# Patient Record
Sex: MALE | Race: NATIVE HAWAIIAN OR OTHER PACIFIC ISLANDER | Employment: OTHER | URBAN - METROPOLITAN AREA
[De-identification: names, ages, dates, MRNs, and addresses within clinical notes are randomized per-mention and may not be internally consistent; named-entity substitution may affect disease eponyms.]

---

## 2023-08-04 ENCOUNTER — APPOINTMENT (OUTPATIENT)
Dept: PRIMARY CARE | Facility: CLINIC | Age: 33
End: 2023-08-04
Payer: MEDICAID

## 2023-09-01 ENCOUNTER — APPOINTMENT (OUTPATIENT)
Dept: PRIMARY CARE | Facility: CLINIC | Age: 33
End: 2023-09-01
Payer: MEDICAID

## 2023-09-15 ENCOUNTER — APPOINTMENT (OUTPATIENT)
Dept: PRIMARY CARE | Facility: CLINIC | Age: 33
End: 2023-09-15
Payer: MEDICAID

## 2023-09-22 ENCOUNTER — OFFICE VISIT (OUTPATIENT)
Dept: PRIMARY CARE | Facility: CLINIC | Age: 33
End: 2023-09-22
Payer: MEDICAID

## 2023-09-22 VITALS
TEMPERATURE: 97.2 F | HEART RATE: 101 BPM | WEIGHT: 245 LBS | SYSTOLIC BLOOD PRESSURE: 122 MMHG | HEIGHT: 70 IN | DIASTOLIC BLOOD PRESSURE: 78 MMHG | BODY MASS INDEX: 35.07 KG/M2 | OXYGEN SATURATION: 96 %

## 2023-09-22 DIAGNOSIS — E66.9 CLASS 2 OBESITY: ICD-10-CM

## 2023-09-22 DIAGNOSIS — Z00.00 ROUTINE GENERAL MEDICAL EXAMINATION AT A HEALTH CARE FACILITY: Primary | ICD-10-CM

## 2023-09-22 DIAGNOSIS — Z13.1 SCREENING FOR DIABETES MELLITUS: ICD-10-CM

## 2023-09-22 DIAGNOSIS — Z13.29 SCREENING FOR THYROID DISORDER: ICD-10-CM

## 2023-09-22 DIAGNOSIS — Z13.220 SCREENING, LIPID: ICD-10-CM

## 2023-09-22 PROCEDURE — 1036F TOBACCO NON-USER: CPT | Performed by: FAMILY MEDICINE

## 2023-09-22 PROCEDURE — 99385 PREV VISIT NEW AGE 18-39: CPT | Performed by: FAMILY MEDICINE

## 2023-09-22 ASSESSMENT — ENCOUNTER SYMPTOMS: SHORTNESS OF BREATH: 0

## 2023-09-22 NOTE — PROGRESS NOTES
Assessment     ASSESSMENT/PLAN:      Problem List Items Addressed This Visit    None  Visit Diagnoses       Routine general medical examination at a health care facility    -  Primary    Screening for diabetes mellitus        Relevant Orders    Hemoglobin A1c    Comprehensive Metabolic Panel    Screening, lipid        Relevant Orders    Lipid panel    Class 2 obesity        Screening for thyroid disorder        Relevant Orders    Tsh With Reflex To Free T4 If Abnormal            Patient Instructions:  Patient Instructions   Get screening labs       Signed by: Sharif Flores DO       FUTURE DIRECTION:   Review labs, could be associated with elevated A1c    Subjective   SUBJECTIVE:     HPI : Patient is a 33 y.o. male who presents today for the following:     Pt states that he is , and had a slightly elevated blood and urine glucose. He was seeing endoocrinologist at one point but has never needed medication. He denies frequent urination.       Review of Systems   Respiratory:  Negative for shortness of breath.    Cardiovascular:  Negative for chest pain.       History reviewed. No pertinent past medical history.     History reviewed. No pertinent surgical history.     No current outpatient medications     No Known Allergies     Social History     Socioeconomic History    Marital status:      Spouse name: Not on file    Number of children: Not on file    Years of education: Not on file    Highest education level: Not on file   Occupational History    Not on file   Tobacco Use    Smoking status: Never    Smokeless tobacco: Never   Vaping Use    Vaping Use: Never used   Substance and Sexual Activity    Alcohol use: Never    Drug use: Never    Sexual activity: Not on file   Other Topics Concern    Not on file   Social History Narrative    Not on file     Social Determinants of Health     Financial Resource Strain: Not on file   Food Insecurity: Not on file   Transportation Needs: Not on file  "  Physical Activity: Not on file   Stress: Not on file   Social Connections: Not on file   Intimate Partner Violence: Not on file   Housing Stability: Not on file        Family History   Problem Relation Name Age of Onset    Diabetes type I Mother      Arthritis Mother      Pancreatic cancer Father          Objective     OBJECTIVE:     Vitals:    09/22/23 1637   BP: 122/78   Pulse: 101   Temp: 36.2 °C (97.2 °F)   SpO2: 96%   Weight: 111 kg (245 lb)   Height: 1.778 m (5' 10\")        Physical Exam  HENT:      Head: Normocephalic and atraumatic.      Nose: Nose normal.      Mouth/Throat:      Mouth: Mucous membranes are moist.   Eyes:      Pupils: Pupils are equal, round, and reactive to light.   Cardiovascular:      Rate and Rhythm: Normal rate and regular rhythm.      Pulses: Normal pulses.      Heart sounds: No murmur heard.  Pulmonary:      Effort: Pulmonary effort is normal.      Breath sounds: Normal breath sounds.   Abdominal:      Tenderness: There is no abdominal tenderness.   Musculoskeletal:         General: Normal range of motion.      Cervical back: Normal range of motion.   Skin:     General: Skin is warm and dry.   Neurological:      Mental Status: He is alert.   Psychiatric:         Mood and Affect: Mood normal.           "

## 2023-09-23 ENCOUNTER — LAB (OUTPATIENT)
Dept: LAB | Facility: LAB | Age: 33
End: 2023-09-23
Payer: MEDICAID

## 2023-09-23 DIAGNOSIS — Z13.220 SCREENING, LIPID: ICD-10-CM

## 2023-09-23 DIAGNOSIS — Z13.1 SCREENING FOR DIABETES MELLITUS: ICD-10-CM

## 2023-09-23 DIAGNOSIS — Z13.29 SCREENING FOR THYROID DISORDER: ICD-10-CM

## 2023-09-23 LAB
ALANINE AMINOTRANSFERASE (SGPT) (U/L) IN SER/PLAS: 31 U/L (ref 10–52)
ALBUMIN (G/DL) IN SER/PLAS: 4.3 G/DL (ref 3.4–5)
ALKALINE PHOSPHATASE (U/L) IN SER/PLAS: 87 U/L (ref 33–120)
ANION GAP IN SER/PLAS: 12 MMOL/L (ref 10–20)
ASPARTATE AMINOTRANSFERASE (SGOT) (U/L) IN SER/PLAS: 25 U/L (ref 9–39)
BILIRUBIN TOTAL (MG/DL) IN SER/PLAS: 1.1 MG/DL (ref 0–1.2)
CALCIUM (MG/DL) IN SER/PLAS: 9.1 MG/DL (ref 8.6–10.3)
CARBON DIOXIDE, TOTAL (MMOL/L) IN SER/PLAS: 24 MMOL/L (ref 21–32)
CHLORIDE (MMOL/L) IN SER/PLAS: 106 MMOL/L (ref 98–107)
CHOLESTEROL (MG/DL) IN SER/PLAS: 127 MG/DL (ref 0–199)
CHOLESTEROL IN HDL (MG/DL) IN SER/PLAS: 35.6 MG/DL
CHOLESTEROL/HDL RATIO: 3.6
CREATININE (MG/DL) IN SER/PLAS: 0.87 MG/DL (ref 0.5–1.3)
ESTIMATED AVERAGE GLUCOSE FOR HBA1C: 137 MG/DL
GFR MALE: >90 ML/MIN/1.73M2
GLUCOSE (MG/DL) IN SER/PLAS: 112 MG/DL (ref 74–99)
HEMOGLOBIN A1C/HEMOGLOBIN TOTAL IN BLOOD: 6.4 %
LDL: 73 MG/DL (ref 0–99)
POTASSIUM (MMOL/L) IN SER/PLAS: 4.1 MMOL/L (ref 3.5–5.3)
PROTEIN TOTAL: 7 G/DL (ref 6.4–8.2)
SODIUM (MMOL/L) IN SER/PLAS: 138 MMOL/L (ref 136–145)
THYROTROPIN (MIU/L) IN SER/PLAS BY DETECTION LIMIT <= 0.05 MIU/L: 1.67 MIU/L (ref 0.44–3.98)
TRIGLYCERIDE (MG/DL) IN SER/PLAS: 91 MG/DL (ref 0–149)
UREA NITROGEN (MG/DL) IN SER/PLAS: 13 MG/DL (ref 6–23)
VLDL: 18 MG/DL (ref 0–40)

## 2023-09-23 PROCEDURE — 80053 COMPREHEN METABOLIC PANEL: CPT

## 2023-09-23 PROCEDURE — 80061 LIPID PANEL: CPT

## 2023-09-23 PROCEDURE — 83036 HEMOGLOBIN GLYCOSYLATED A1C: CPT

## 2023-09-23 PROCEDURE — 36415 COLL VENOUS BLD VENIPUNCTURE: CPT

## 2023-09-23 PROCEDURE — 84443 ASSAY THYROID STIM HORMONE: CPT

## 2023-09-26 ENCOUNTER — TELEPHONE (OUTPATIENT)
Dept: PRIMARY CARE | Facility: CLINIC | Age: 33
End: 2023-09-26
Payer: MEDICAID

## 2023-10-20 ENCOUNTER — APPOINTMENT (OUTPATIENT)
Dept: UROLOGY | Facility: CLINIC | Age: 33
End: 2023-10-20
Payer: MEDICAID

## 2024-04-02 ENCOUNTER — TELEPHONE (OUTPATIENT)
Dept: UROLOGY | Facility: CLINIC | Age: 34
End: 2024-04-02
Payer: MEDICAID

## 2024-04-02 NOTE — TELEPHONE ENCOUNTER
Left message for patient to return our call, had to move his appointment from 4/5 to 4/12 @ 10:30 due to Dr. Hammer being out of the office

## 2024-04-05 ENCOUNTER — APPOINTMENT (OUTPATIENT)
Dept: UROLOGY | Facility: CLINIC | Age: 34
End: 2024-04-05
Payer: MEDICAID

## 2024-04-08 ENCOUNTER — OFFICE VISIT (OUTPATIENT)
Dept: UROLOGY | Facility: CLINIC | Age: 34
End: 2024-04-08
Payer: MEDICAID

## 2024-04-08 DIAGNOSIS — Z30.09 FAMILY PLANNING: Primary | ICD-10-CM

## 2024-04-08 PROCEDURE — 99202 OFFICE O/P NEW SF 15 MIN: CPT | Performed by: UROLOGY

## 2024-04-08 RX ORDER — DIAZEPAM 5 MG/1
5 TABLET ORAL AS NEEDED
Qty: 1 TABLET | Refills: 0 | Status: SHIPPED | OUTPATIENT
Start: 2024-04-08 | End: 2024-04-15

## 2024-04-08 NOTE — PROGRESS NOTES
04/08/2024  We discussed scalpeless vasectomy procedure, the indications and potential side effects and complications. Patient expressed understanding and agreed to proceed the procedure.    Plan  Valium 5 mg 1 hour before procedure  Vasectomy    I have personally reviewed the OARRS report for this patient. This report is scanned into the electronic medical record. I have considered the risk of abuse, dependence, addictions and diversion. I believe that it is clinically appropriate for this patient to be prescribed this medication    Chief Complaint   Patient presents with    Sterilization     Patient here today for a vasectomy consult.  Patient  with 2 biological children, no pain or trauma to the testicles.         Physical Exam     TODAYS LAB RESULTS:    Patient unable to leave urine sample.     ASSESSMENT&PLAN:      IMPRESSIONS:

## 2024-04-12 ENCOUNTER — APPOINTMENT (OUTPATIENT)
Dept: UROLOGY | Facility: CLINIC | Age: 34
End: 2024-04-12
Payer: MEDICAID

## 2024-06-21 ENCOUNTER — APPOINTMENT (OUTPATIENT)
Dept: UROLOGY | Facility: CLINIC | Age: 34
End: 2024-06-21
Payer: MEDICAID

## 2024-06-21 DIAGNOSIS — Z98.52 STATUS POST VASECTOMY: Primary | ICD-10-CM

## 2024-06-21 DIAGNOSIS — Z30.09 FAMILY PLANNING: ICD-10-CM

## 2024-06-21 PROCEDURE — 55250 REMOVAL OF SPERM DUCT(S): CPT | Performed by: UROLOGY

## 2024-06-21 RX ORDER — HYDROCODONE BITARTRATE AND ACETAMINOPHEN 5; 325 MG/1; MG/1
1 TABLET ORAL EVERY 6 HOURS PRN
Qty: 20 TABLET | Refills: 0 | Status: SHIPPED | OUTPATIENT
Start: 2024-06-21 | End: 2024-06-28

## 2024-06-21 RX ORDER — LIDOCAINE HYDROCHLORIDE 10 MG/ML
10 INJECTION INFILTRATION; PERINEURAL ONCE
Status: COMPLETED | OUTPATIENT
Start: 2024-06-21 | End: 2024-06-21

## 2024-06-21 RX ORDER — CEPHALEXIN 500 MG/1
500 CAPSULE ORAL 2 TIMES DAILY
Qty: 14 CAPSULE | Refills: 0 | Status: SHIPPED | OUTPATIENT
Start: 2024-06-21 | End: 2024-06-28

## 2024-06-21 NOTE — PROGRESS NOTES
06/21/2024  A scalpeless vasectomy was performed under local and patient tolerated it well.    the patient was prepped and draped in the usual sterile fashion.  While in the supine position, the left vas deferens was isolated and the skin and vas were anesthetized using 1% lidocaine.  A small incision was made in the skin using a scalpeless clamp.  The vas was delivered up into the wound and a segment was freed up and divided.  The proximal and distal ends were fulgurated. Fascial interposition stitch placed using 3-0 chromic   The ends of the vas were placed back into the scrotum and the skin was closed with all suture.  The right vas deferens was isolated and the skin and vas were anesthetized using 1% lidocaine.  A small incision was made in the skin using a scalpeless clamp.   the vas was delivered up into the wound and a segment was freed up and divided.  The proximal and distal ends were fulgurated and tied with 3-0 Chromic suture.  The ends of the vas were placed back into the scrotum and the skin was closed with suture.  The patient tolerated the procedure well and was discharged to home. He was provided with post-vasectomy instructions. He was advised to rest for 24 hours and abstain from sexual intercourse for 1 week. He was advised for the continued need for contraception until he is documented as sterile with semen analysis.    Impression  Family planning  Status post vasectomy    Plan  Norco ×20  Keflex 500 mg twice daily ×7 days  Appointment with specimen in 10 weeks        Chief Complaint   Patient presents with    Sterilization     Patient here today for a vasectomy. Patient is very nervous for procedure, let Dr. Hammer know prior to us doing procedure.        Physical Exam     TODAYS LAB RESULTS:    No Urine sample given    ASSESSMENT&PLAN:      IMPRESSIONS:         04/08/2024  We discussed scalpeless vasectomy procedure, the indications and potential side effects and complications. Patient expressed  understanding and agreed to proceed the procedure.     Plan  Valium 5 mg 1 hour before procedure  Vasectomy

## 2024-08-30 ENCOUNTER — APPOINTMENT (OUTPATIENT)
Dept: UROLOGY | Facility: CLINIC | Age: 34
End: 2024-08-30
Payer: MEDICAID

## 2024-08-30 DIAGNOSIS — Z98.52 STATUS POST VASECTOMY: Primary | ICD-10-CM

## 2024-08-30 DIAGNOSIS — N48.1 BALANITIS: ICD-10-CM

## 2024-08-30 PROCEDURE — 99024 POSTOP FOLLOW-UP VISIT: CPT | Performed by: UROLOGY

## 2024-08-30 RX ORDER — CLOTRIMAZOLE AND BETAMETHASONE DIPROPIONATE 10; .64 MG/G; MG/G
1 CREAM TOPICAL 2 TIMES DAILY
Qty: 30 G | Refills: 1 | Status: SHIPPED | OUTPATIENT
Start: 2024-08-30 | End: 2024-09-27

## 2024-08-30 NOTE — PROGRESS NOTES
08/30/2024  Patient did fine, except some burning at tip of penis    Exam: Scrotal incision healed, uncircumcised some erythematous glans penis    First Semen specimen: no sperm    Patient  10-week status post vasectomy  Balanitis    Plan:  Lotrisone cream  Drop off a second semen specimen in a week    Chief Complaint   Patient presents with    Sterilization     Patient is here today 10 week post op vasectomy         Physical Exam     TODAYS LAB RESULTS:      ASSESSMENT&PLAN:      IMPRESSIONS:      06/21/2024  A scalpeless vasectomy was performed under local and patient tolerated it well.     the patient was prepped and draped in the usual sterile fashion.  While in the supine position, the left vas deferens was isolated and the skin and vas were anesthetized using 1% lidocaine.  A small incision was made in the skin using a scalpeless clamp.  The vas was delivered up into the wound and a segment was freed up and divided.  The proximal and distal ends were fulgurated. Fascial interposition stitch placed using 3-0 chromic   The ends of the vas were placed back into the scrotum and the skin was closed with all suture.  The right vas deferens was isolated and the skin and vas were anesthetized using 1% lidocaine.  A small incision was made in the skin using a scalpeless clamp.   the vas was delivered up into the wound and a segment was freed up and divided.  The proximal and distal ends were fulgurated and tied with 3-0 Chromic suture.  The ends of the vas were placed back into the scrotum and the skin was closed with suture.  The patient tolerated the procedure well and was discharged to home. He was provided with post-vasectomy instructions. He was advised to rest for 24 hours and abstain from sexual intercourse for 1 week. He was advised for the continued need for contraception until he is documented as sterile with semen analysis.     Impression  Family planning  Status post vasectomy     Plan  Norco ×20  Keflex  500 mg twice daily ×7 days  Appointment with specimen in 10 weeks                Chief Complaint   Patient presents with    Sterilization       Patient here today for a vasectomy. Patient is very nervous for procedure, let Dr. Hammer know prior to us doing procedure.         Physical Exam      TODAYS LAB RESULTS:     No Urine sample given     ASSESSMENT&PLAN:        IMPRESSIONS:          04/08/2024  We discussed scalpeless vasectomy procedure, the indications and potential side effects and complications. Patient expressed understanding and agreed to proceed the procedure.     Plan  Valium 5 mg 1 hour before procedure  Vasectomy

## 2024-09-06 ENCOUNTER — APPOINTMENT (OUTPATIENT)
Dept: UROLOGY | Facility: CLINIC | Age: 34
End: 2024-09-06
Payer: MEDICAID

## 2024-12-31 ENCOUNTER — APPOINTMENT (OUTPATIENT)
Dept: PRIMARY CARE | Facility: CLINIC | Age: 34
End: 2024-12-31
Payer: MEDICAID

## 2025-02-26 ENCOUNTER — APPOINTMENT (OUTPATIENT)
Dept: UROLOGY | Facility: CLINIC | Age: 35
End: 2025-02-26
Payer: MEDICAID

## 2025-02-26 VITALS
HEART RATE: 104 BPM | WEIGHT: 233.6 LBS | SYSTOLIC BLOOD PRESSURE: 123 MMHG | DIASTOLIC BLOOD PRESSURE: 76 MMHG | BODY MASS INDEX: 34.6 KG/M2 | TEMPERATURE: 97.4 F | HEIGHT: 69 IN

## 2025-02-26 DIAGNOSIS — N52.8 OTHER MALE ERECTILE DYSFUNCTION: ICD-10-CM

## 2025-02-26 DIAGNOSIS — R35.0 URINARY FREQUENCY: Primary | ICD-10-CM

## 2025-02-26 DIAGNOSIS — N48.1 BALANITIS: ICD-10-CM

## 2025-02-26 DIAGNOSIS — N32.81 OAB (OVERACTIVE BLADDER): ICD-10-CM

## 2025-02-26 LAB
POC APPEARANCE, URINE: CLEAR
POC BILIRUBIN, URINE: NEGATIVE
POC BLOOD, URINE: ABNORMAL
POC COLOR, URINE: YELLOW
POC GLUCOSE, URINE: ABNORMAL MG/DL
POC KETONES, URINE: ABNORMAL MG/DL
POC LEUKOCYTES, URINE: NEGATIVE
POC NITRITE,URINE: NEGATIVE
POC PH, URINE: 6 PH
POC PROTEIN, URINE: NEGATIVE MG/DL
POC SPECIFIC GRAVITY, URINE: 1.02
POC UROBILINOGEN, URINE: 0.2 EU/DL

## 2025-02-26 PROCEDURE — 3008F BODY MASS INDEX DOCD: CPT | Performed by: NURSE PRACTITIONER

## 2025-02-26 PROCEDURE — 81003 URINALYSIS AUTO W/O SCOPE: CPT | Performed by: NURSE PRACTITIONER

## 2025-02-26 PROCEDURE — 99214 OFFICE O/P EST MOD 30 MIN: CPT | Performed by: NURSE PRACTITIONER

## 2025-02-26 RX ORDER — CLOTRIMAZOLE AND BETAMETHASONE DIPROPIONATE 10; .64 MG/G; MG/G
1 CREAM TOPICAL 2 TIMES DAILY
Qty: 15 G | Refills: 2 | Status: SHIPPED | OUTPATIENT
Start: 2025-02-26 | End: 2025-03-26

## 2025-02-26 RX ORDER — TADALAFIL 5 MG/1
5 TABLET ORAL DAILY
Qty: 15 TABLET | Refills: 0 | Status: SHIPPED | OUTPATIENT
Start: 2025-02-26

## 2025-02-26 RX ORDER — OXYBUTYNIN CHLORIDE 5 MG/1
5 TABLET, EXTENDED RELEASE ORAL DAILY
Qty: 30 TABLET | Refills: 2 | Status: SHIPPED | OUTPATIENT
Start: 2025-02-26 | End: 2026-02-26

## 2025-02-26 NOTE — PATIENT INSTRUCTIONS
Pelvic Floor PT Locations    [facility]    [address] [place] [address]  [phone]    Jo Varela, PT    B/B*  Sherley Jesus PT    B [facility]  [address]  [place] [address]  [phone]    Mckayla Schneider, PT   B [facility], [address]  [place], [place] [address]  [phone]    Niki Vences, PT (PRN ONLY) B/B  Males/females  TRANS   [facility]  [address] [place] [address]  [phone]    Sabrina Vega, PT       B/B*  males/females  Jeanette Hawk PT          B*  [facility]  [address] [place] [address]  [phone]    Sandra Cabello, PT (PRN)  B  Molly Rosenbaum, PT              B/B [facility]  [address]  [place] [address]  [phone]    Shukri Rucker, PT      B    [facility]   [address]  [place] [address]  [phone]    Tammie Nichols, PT  B  ATHELETES ONLY     [facility]  [facility] [address]  [address] [place] [address]  [phone]    Basia Rojas, PT B [facility]  [address]  [place] [address]  [phone]    Marisel Marr, PT   B [facility]  [address]  [place] [address]  [phone]    Britt Bateman, PT     B/B*   [facility]  [address]  [place] [address]  [phone]    Latrice Alvarenga, PT  B/B*   males/Females [facility]  [address]  [place] [address]  [phone]     Keturah Coburn, PT B/B*  males/females  Marisel Marr (Thurs), PT   B*  Shital Sandoval, PT  (PRN ONLY)     B/B* males/females  TRANS [facility]  [address]  [place] [address]  [phone]    Ami White, PT   B     Shala Coleman, PT    B/B*  males/females   [facility]  [facility]  [address]  [place] [address]  [phone]    Olivia Todd, PT    B [facility]  [address]  [place] [address]  [phone]    Keturah Meadows, PT           B/B *  TRANS  [facility] [place]  [address]   [address]   [place] [address] [phone]    Sherley Garcia    B*     [facility]  [address] [place] [address]  [phone]    Nga Damian, PT   B/B  Ivania Tracy, PT   B/B [facility] [place]  [address] [place] [address]  [phone]    Gloria Duval, PT     B/B*   males/females  [facility]  [address] [place] [address]  [phone]    Nessa Jackson, PT   B/B*  males/females   [facility] [place]  [address] [place] [address]  [phone]    Gloria Duval PT     B/B   males/females   Celi Bloom, PT    B  Michelle Garcia PT     B TRANS [facility] [place]  [address] [place] [address].   [place] [address]  [phone]    Hazel Dawson, PT  B/B  males/females  Leila Crockett, PT  B [facility]  [address] [place], [place] [id number] [phone]    Palma Jacobson, PT B/B *  males/females     B/B: Bowel and Bladder    *biofeedback offered  B: Bladder only

## 2025-02-26 NOTE — PROGRESS NOTES
Pt has urinary frequency all the time when he has togo he has to go he can not wait. Pt denies any pain or burning. Patient drinks lots of water, sits a lot due to being a . Pt stated he slowed down on the water to see if that helps but it has not

## 2025-02-26 NOTE — PROGRESS NOTES
UROLOGIC INITIAL EVALUATION     PROBLEM LIST:  1. Urinary frequency  POCT UA Automated manually resulted      2. OAB (overactive bladder)  oxybutynin XL (Ditropan XL) 5 mg 24 hr tablet    Referral to Physical Therapy      3. Balanitis  clotrimazole-betamethasone (Lotrisone) cream      4. Other male erectile dysfunction  tadalafil (Cialis) 5 mg tablet           HISTORY OF PRESENT ILLNESS:   Juan Luis Pickard is a 34 y.o. 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    PAST SURGICAL HISTORY:  No past surgical history on file.     ALLERGIES:   No Known Allergies     MEDICATIONS:   Current Outpatient Medications on File Prior to Visit   Medication Sig Dispense Refill    diazePAM (Valium) 5 mg tablet Take 1 tablet (5 mg) by mouth if needed for anxiety for up to 7 days. 1 tablet 0     No current facility-administered medications on file prior to visit.        SOCIAL HISTORY:  Patient  reports that he has never smoked. He has never used smokeless tobacco. He reports that he does not drink alcohol and does not use drugs.   Social History     Socioeconomic History    Marital status:      Spouse name: Not on file    Number of children: Not on file    Years of education: Not on file    Highest education level: Not on file   Occupational History  "   Not on file   Tobacco Use    Smoking status: Never    Smokeless tobacco: Never   Vaping Use    Vaping status: Never Used   Substance and Sexual Activity    Alcohol use: Never    Drug use: Never    Sexual activity: Not on file   Other Topics Concern    Not on file   Social History Narrative    Not on file     Social Drivers of Health     Financial Resource Strain: Not on file   Food Insecurity: Not on file   Transportation Needs: Not on file   Physical Activity: Not on file   Stress: Not on file   Social Connections: Not on file   Intimate Partner Violence: Not on file   Housing Stability: Not on file       FAMILY HISTORY:  Family History   Problem Relation Name Age of Onset    Diabetes type I Mother      Arthritis Mother      Pancreatic cancer Father         REVIEW OF SYSTEMS:  All systems reviewed, pertinent negatives as noted in HPI.     PHYSICAL EXAM:  Visit Vitals  /76   Pulse 104   Temp 36.3 °C (97.4 °F)     Constitutional: Well-developed and well-nourished. No distress.    Head: Normocephalic and atraumatic.    Neck: Normal range of motion.     Pulmonary/Chest: Effort normal. No respiratory distress.   Abdominal: Non-distended.  : See below.  Integumentary: No rash or lesions visualized.  Musculoskeletal: Normal range of motion.    Neurological: Alert, grossly intact.  Psychiatric: Normal mood and affect. Thought content normal.      LABORATORY REVIEW:   Lab Results   Component Value Date    BUN 13 09/23/2023    CREATININE 0.87 09/23/2023     09/23/2023    K 4.1 09/23/2023     09/23/2023    CO2 24 09/23/2023    CALCIUM 9.1 09/23/2023      No results found for: \"WBC\", \"RBC\", \"HGB\", \"HCT\", \"MCV\", \"MCH\", \"MCHC\", \"RDW\", \"PLT\", \"MPV\"     No results found for: \"PSA\"          Assessment:      1. Urinary frequency  POCT UA Automated manually resulted      2. OAB (overactive bladder)  oxybutynin XL (Ditropan XL) 5 mg 24 hr tablet    Referral to Physical Therapy      3. Balanitis  " clotrimazole-betamethasone (Lotrisone) cream      4. Other male erectile dysfunction  tadalafil (Cialis) 5 mg tablet          Juan Luis Pickard is a 34 y.o. with hx vasectomy, urinary frequency & ED  Mild LUTS; IPSS 2-5, QOL 3  Mild-moderate ED, RUFINA 13-14  UA today + glucose, heme  Balanitis with moderate phimosis on exam    Discussed natural hx of OAB and multimodal approach to treatment, including medication, physical therapy, and behavioural modification  Discussed natural history of ED, most frequently an early manifestation of small vessel disease from chronic conditions such as HTN, DM  Reviewed options for treatment, including PDE5i medications, ICI, and IPP  Reviewed potential adverse effects of PDE5is including risk of priapism; aware that priapism is a medical emergency and erections lasting >4 hours may lead to complete loss of function   Agreeable to plan as below    Plan:   Clotriamazole-betamethasone cream BID for balanitis  Trial of oxybutynin XL po daily for OAB  Refer to pelvic floor PT  Tadalafil 5 mg po prn sexual activity  RTC in 8-12 weeks for reassessment  Encouraged to contact us in the interim with any questions, concerns   Encouraged to contact us in the interim with any questions, concerns

## 2025-02-28 ENCOUNTER — APPOINTMENT (OUTPATIENT)
Dept: PRIMARY CARE | Facility: CLINIC | Age: 35
End: 2025-02-28
Payer: MEDICAID

## 2025-02-28 ENCOUNTER — TELEPHONE (OUTPATIENT)
Dept: UROLOGY | Facility: CLINIC | Age: 35
End: 2025-02-28

## 2025-03-17 ENCOUNTER — TELEPHONE (OUTPATIENT)
Dept: UROLOGY | Facility: CLINIC | Age: 35
End: 2025-03-17
Payer: MEDICAID

## 2025-03-17 DIAGNOSIS — N52.8 OTHER MALE ERECTILE DYSFUNCTION: ICD-10-CM

## 2025-03-17 NOTE — TELEPHONE ENCOUNTER
Patient called in requesting cialis prescription be sent to [facility] in [place]. He stated that he is willing to pay out of pocket if needed for the script. Please let me know when it is sent, patient requested call back.

## 2025-03-18 RX ORDER — TADALAFIL 5 MG/1
5 TABLET ORAL DAILY
Qty: 15 TABLET | Refills: 0 | OUTPATIENT
Start: 2025-03-18

## 2025-04-09 ENCOUNTER — APPOINTMENT (OUTPATIENT)
Dept: UROLOGY | Facility: CLINIC | Age: 35
End: 2025-04-09
Payer: MEDICAID

## 2025-04-17 ENCOUNTER — APPOINTMENT (OUTPATIENT)
Dept: UROLOGY | Facility: HOSPITAL | Age: 35
End: 2025-04-17
Payer: MEDICAID

## 2025-07-28 ENCOUNTER — APPOINTMENT (OUTPATIENT)
Dept: UROLOGY | Facility: CLINIC | Age: 35
End: 2025-07-28
Payer: MEDICAID

## 2025-08-01 ENCOUNTER — APPOINTMENT (OUTPATIENT)
Dept: UROLOGY | Facility: CLINIC | Age: 35
End: 2025-08-01
Payer: MEDICAID

## 2025-08-14 ENCOUNTER — APPOINTMENT (OUTPATIENT)
Dept: UROLOGY | Facility: CLINIC | Age: 35
End: 2025-08-14
Payer: MEDICAID

## 2025-08-15 ENCOUNTER — APPOINTMENT (OUTPATIENT)
Dept: UROLOGY | Facility: CLINIC | Age: 35
End: 2025-08-15
Payer: MEDICAID